# Patient Record
Sex: MALE | Race: WHITE | Employment: OTHER | URBAN - METROPOLITAN AREA
[De-identification: names, ages, dates, MRNs, and addresses within clinical notes are randomized per-mention and may not be internally consistent; named-entity substitution may affect disease eponyms.]

---

## 2021-09-15 ENCOUNTER — OFFICE VISIT (OUTPATIENT)
Dept: ENT CLINIC | Age: 66
End: 2021-09-15
Payer: MEDICARE

## 2021-09-15 VITALS
HEIGHT: 70 IN | DIASTOLIC BLOOD PRESSURE: 85 MMHG | SYSTOLIC BLOOD PRESSURE: 142 MMHG | BODY MASS INDEX: 30.06 KG/M2 | HEART RATE: 72 BPM | TEMPERATURE: 97.2 F | WEIGHT: 210 LBS

## 2021-09-15 DIAGNOSIS — J34.3 HYPERTROPHY OF INFERIOR NASAL TURBINATE: ICD-10-CM

## 2021-09-15 DIAGNOSIS — R09.81 NASAL CONGESTION: ICD-10-CM

## 2021-09-15 DIAGNOSIS — J34.2 DEVIATED SEPTUM: ICD-10-CM

## 2021-09-15 DIAGNOSIS — T16.2XXA FOREIGN BODY OF LEFT EAR, INITIAL ENCOUNTER: Primary | ICD-10-CM

## 2021-09-15 DIAGNOSIS — J32.9 CHRONIC SINUSITIS, UNSPECIFIED LOCATION: ICD-10-CM

## 2021-09-15 PROCEDURE — 1123F ACP DISCUSS/DSCN MKR DOCD: CPT | Performed by: OTOLARYNGOLOGY

## 2021-09-15 PROCEDURE — 99204 OFFICE O/P NEW MOD 45 MIN: CPT | Performed by: OTOLARYNGOLOGY

## 2021-09-15 PROCEDURE — G8427 DOCREV CUR MEDS BY ELIG CLIN: HCPCS | Performed by: OTOLARYNGOLOGY

## 2021-09-15 PROCEDURE — G8419 CALC BMI OUT NRM PARAM NOF/U: HCPCS | Performed by: OTOLARYNGOLOGY

## 2021-09-15 PROCEDURE — 69200 CLEAR OUTER EAR CANAL: CPT | Performed by: OTOLARYNGOLOGY

## 2021-09-15 PROCEDURE — 4040F PNEUMOC VAC/ADMIN/RCVD: CPT | Performed by: OTOLARYNGOLOGY

## 2021-09-15 PROCEDURE — 3017F COLORECTAL CA SCREEN DOC REV: CPT | Performed by: OTOLARYNGOLOGY

## 2021-09-15 PROCEDURE — 1036F TOBACCO NON-USER: CPT | Performed by: OTOLARYNGOLOGY

## 2021-09-15 RX ORDER — FLUTICASONE PROPIONATE 50 MCG
1 SPRAY, SUSPENSION (ML) NASAL DAILY
Qty: 32 G | Refills: 1 | Status: SHIPPED | OUTPATIENT
Start: 2021-09-15 | End: 2022-04-01 | Stop reason: SDUPTHER

## 2021-09-15 RX ORDER — MULTIVIT-MIN/IRON/FOLIC ACID/K 18-600-40
CAPSULE ORAL
COMMUNITY

## 2021-09-15 NOTE — PROGRESS NOTES
[facility]      Patient Name: Araceli Espinoza Record Number:  <[id number]>  Primary Care Physician:  Fernanda Bundy MD  Date of Consultation: 9/15/2021    Chief Complaint: Foreign body left ear        HISTORY OF PRESENT ILLNESS  Humberto Abel is a(n) 77 y.o. male who presents for evaluation of a bug in the left ear. Reportedly the patient had a bug fly in the left ear yesterday. He said that he copiously flushed and feels as though they probably drown it. He said he can feel moving around for a while. He still feels as though the ear is plugged and thinks it still inside the ear. He has never had any issue like this before. No other ear history. Patient also says that he has a lot of nasal congestion. He says that he has had nasal surgery a couple of times. This was in the 1980s. He thinks that he had a septum repaired. Not sure what else they have done. Sense of smell is good. He has tried nasal steroids. REVIEW OF SYSTEMS  As above    PHYSICAL EXAM  GENERAL: No Acute Distress, Alert and Oriented, no Hoarseness, strong voice  EYES: EOMI, Anti-icteric  HENT:   Head: Normocephalic and atraumatic. Face:  Symmetric, facial nerve intact, no sinus tenderness   ears: See below  Mouth/Oral Cavity:  normal lips, Uvula is midline, no mucosal lesions  Oropharynx/Larynx:  normal oropharynx  Nose: Patient has some rhinophyma . Anterior rhinoscopy shows a a rightward deviated septum. He has fairly diffuse mucosal edema involving the septum as well as the turbinate. The turbinates are very large.   NECK: Normal range of motion, no thyromegaly, trachea is midline, no lymphadenopathy, no neck masses, no crepitus  CHEST: Normal respiratory effort, no retractions, breathing comfortably  SKIN: No rashes, normal appearing skin, no evidence of skin lesions/tumors  Neuro:  cranial nerve II-XII intact; normal gait  Cardio:  no edema    PROCEDURE  Bilateral ear exam with removal of foreign body  Right ear is visualized microscope. Tympanic membrane intact and aerated middle ear    On the left side the patient had what appeared to be an insect occluding the ear canal.  I removed this with an alligator forcep. There was some irritation of the ear canal tympanic membrane, but it appeared to be intact. ASSESSMENT/PLAN  1. Foreign body of left ear, initial encounter  This was consistent with a an insect. It was fairly irritated so I am not to give him a short course of eardrops to prevent an infection. 2. Nasal congestion  Patient appears to have diffuse nasal mucosal edema. I want to start him on Flonase. I also started him on nasal saline irrigation. He has had multiple nasal surgeries and I would like to see exactly what was done and also rule out chronic sinusitis. He is going to follow-up with a CT scan in a few weeks. 3. Deviated septum  May consider revision septoplasty  - CT SINUS FOR IMAGE GUIDANCE; Future    4. Hypertrophy of inferior nasal turbinate  Consider inferior turbinate reduction, will discuss on follow-up    5. Chronic sinusitis, unspecified location  I like to rule out chronic sinusitis given the severe mucosal edema and the previous surgeries that he has had. I have performed a head and neck physical exam personally or was physically present during the key or critical portions of the service. This note was generated completely or in part utilizing Dragon dictation speech recognition software. Occasionally, words are mistranscribed and despite editing, the text may contain inaccuracies due to incorrect word recognition. If further clarification is needed please contact the office at [phone].

## 2022-01-12 ENCOUNTER — OFFICE VISIT (OUTPATIENT)
Dept: ENT CLINIC | Age: 67
End: 2022-01-12
Payer: MEDICARE

## 2022-01-12 ENCOUNTER — HOSPITAL ENCOUNTER (OUTPATIENT)
Dept: CT IMAGING | Age: 67
Discharge: HOME | End: 2022-01-12
Payer: MEDICARE

## 2022-01-12 VITALS
DIASTOLIC BLOOD PRESSURE: 86 MMHG | WEIGHT: 222 LBS | BODY MASS INDEX: 31.85 KG/M2 | HEART RATE: 86 BPM | SYSTOLIC BLOOD PRESSURE: 149 MMHG

## 2022-01-12 DIAGNOSIS — J34.3 HYPERTROPHY OF INFERIOR NASAL TURBINATE: ICD-10-CM

## 2022-01-12 DIAGNOSIS — J34.2 DEVIATED SEPTUM: ICD-10-CM

## 2022-01-12 DIAGNOSIS — R09.81 NASAL CONGESTION: ICD-10-CM

## 2022-01-12 DIAGNOSIS — J32.9 CHRONIC SINUSITIS, UNSPECIFIED LOCATION: Primary | ICD-10-CM

## 2022-01-12 PROCEDURE — 3017F COLORECTAL CA SCREEN DOC REV: CPT | Performed by: OTOLARYNGOLOGY

## 2022-01-12 PROCEDURE — 4040F PNEUMOC VAC/ADMIN/RCVD: CPT | Performed by: OTOLARYNGOLOGY

## 2022-01-12 PROCEDURE — G8427 DOCREV CUR MEDS BY ELIG CLIN: HCPCS | Performed by: OTOLARYNGOLOGY

## 2022-01-12 PROCEDURE — 99214 OFFICE O/P EST MOD 30 MIN: CPT | Performed by: OTOLARYNGOLOGY

## 2022-01-12 PROCEDURE — 70486 CT MAXILLOFACIAL W/O DYE: CPT

## 2022-01-12 PROCEDURE — 1123F ACP DISCUSS/DSCN MKR DOCD: CPT | Performed by: OTOLARYNGOLOGY

## 2022-01-12 PROCEDURE — 1036F TOBACCO NON-USER: CPT | Performed by: OTOLARYNGOLOGY

## 2022-01-12 PROCEDURE — G8417 CALC BMI ABV UP PARAM F/U: HCPCS | Performed by: OTOLARYNGOLOGY

## 2022-01-12 PROCEDURE — G8484 FLU IMMUNIZE NO ADMIN: HCPCS | Performed by: OTOLARYNGOLOGY

## 2022-01-12 RX ORDER — PREDNISONE 10 MG/1
TABLET ORAL
Qty: 30 TABLET | Refills: 0 | Status: SHIPPED | OUTPATIENT
Start: 2022-01-12 | End: 2022-03-04 | Stop reason: ALTCHOICE

## 2022-01-12 RX ORDER — DOXYCYCLINE HYCLATE 100 MG
100 TABLET ORAL 2 TIMES DAILY
Qty: 42 TABLET | Refills: 0 | Status: SHIPPED | OUTPATIENT
Start: 2022-01-12 | End: 2022-02-02

## 2022-01-12 NOTE — PROGRESS NOTES
[facility] [facility]  Follow up      Patient Name: Araceli Espinoza Record Number:  <[id number]>  Primary Care Physician:  Alicia Garcia MD  Date of Consultation: 1/12/2022    Chief Complaint: Nasal issues        Interval History    I first met the patient September 2021 where he had an insect in his ear. We removed that, but he also had a lot of nasal symptoms. He has a history of multiple nasal surgeries. I wanted to follow-up with a CAT scan to see exactly what was going on with his nose. He says that he really does not have much airflow at all out of his nose. He does have some sense of smell. He is using Flonase daily and irrigations. No ear problems. REVIEW OF SYSTEMS  Above    PHYSICAL EXAM  GENERAL: No Acute Distress, Alert and Oriented, no Hoarseness, strong voice  EYES: EOMI, Anti-icteric  HENT:   Head: Normocephalic and atraumatic. Face:  Symmetric, facial nerve intact, no sinus tenderness  Right Ear: Normal external ear, normal external auditory canal, intact tympanic membrane with normal mobility and aerated middle ear  Left Ear: Normal external ear, normal external auditory canal, intact tympanic membrane with normal mobility and aerated middle ear  Mouth/Oral Cavity:  normal lips, Uvula is midline, no mucosal lesions  Oropharynx/Larynx:  normal oropharynx,  Nose:Normal external nasal appearance. Anterior rhinoscopy shows severe mucosal edema with a fairly prominent right upper septal deviation. He has a very large turbinate on the right side he really cannot see anything past the anterior nasal cavity. He has almost 100% obstruction of the nasal cavity on the right. On the left side there is some purulent drainage. NECK: Normal range of motion, no thyromegaly, trachea is midline, no lymphadenopathy, no neck masses, no crepitus        RADIOLOGY  Summary of findings:  I have reviewed his CT scan. He has fairly diffuse sinusitis.   He has an intersinus septal cell in the frontal sinus as well as a possible type I frontal sinus on the right that are all opacified. The appearance of the anterior ethmoid cell and extending of frontal sinus suggest perhaps a bit of an expanding mucocele. He also has disease involving the bilateral maxillary sinuses with what appears to be previous max antrostomies. He does appear to have had a bit of work in the anterior ethmoids, but there are still a lot of septations with complete opacification. Opacification of the sphenoid sinuses as well. The skull base and lamina appear to be intact        ASSESSMENT/PLAN  1. Chronic sinusitis, unspecified location  This patient has severe ongoing sinus disease. I think he would benefit from revision sinus surgery. Judging by CT scan it looks like only his maxillary sinuses were initially addressed with surgery. I think he needs all 4 sinuses opened up. In addition he really has no nasal airway secondary to a septal deviation and very large turbinates. I would consider doing a revision septoplasty debating what it looks like, but he definitely will need partial resection of the inferior turbinate at least on the right side open up his nasal airway. Patient understands the risk of sinus surgery, septoplasty and turbinate duction including epistaxis, ongoing nasal congestion, recurrence of his symptoms and need for additional surgery. He says that this has been bothering him for years and he is very eager to have something done    Meantime I am to give him a prednisone taper as well as 3 weeks of antibiotic. He may be going out of town for a couple of months which is fine. I told him that if he has recurrence of his symptoms after I give him the antibiotics and before surgery he needs to call the office so I can give him another round of prednisone and antibiotics as this will make the surgery easier. 2. Nasal congestion  As above    3.  Deviated septum  As

## 2022-02-02 ENCOUNTER — TELEPHONE (OUTPATIENT)
Dept: ENT CLINIC | Age: 67
End: 2022-02-02

## 2022-02-02 RX ORDER — DOXYCYCLINE HYCLATE 100 MG
100 TABLET ORAL 2 TIMES DAILY
Qty: 10 TABLET | Refills: 0 | Status: SHIPPED | OUTPATIENT
Start: 2022-02-02 | End: 2022-02-07 | Stop reason: SDUPTHER

## 2022-02-02 NOTE — TELEPHONE ENCOUNTER
Pt requesting refills of  predniSONE (DELTASONE) 10 MG tablet and doxycycline hyclate (VIBRA-TABS) 100 MG tablet    Would like sent to [address] [facility], he is in [place] [phone]

## 2022-02-02 NOTE — TELEPHONE ENCOUNTER
Mary Billingsley can only call him in the doxycycline 100 mg twice a day for 10 days. I would hold off on additional prednisone has not been very long. We also confirm when is considering scheduling surgery?

## 2022-02-07 RX ORDER — DOXYCYCLINE HYCLATE 100 MG
100 TABLET ORAL 2 TIMES DAILY
Qty: 20 TABLET | Refills: 0 | Status: SHIPPED | OUTPATIENT
Start: 2022-02-07 | End: 2022-02-17

## 2022-02-08 DIAGNOSIS — Z41.9 SURGERY, ELECTIVE: Primary | ICD-10-CM

## 2022-02-22 ENCOUNTER — TELEPHONE (OUTPATIENT)
Dept: ENT CLINIC | Age: 67
End: 2022-02-22

## 2022-02-22 NOTE — TELEPHONE ENCOUNTER
Pt calling, states he thought maybe he might need a refill of the prednisone, he's not sure. He said when he was using it, it helped the swelling and now that it's gone, the swelling is back and it's harder to breathe. If Dr Radha Coleman does not want to refill it, he is fine with that, he just wasn't sure. Please call to advise. Pt does have surgery scheduled in March.

## 2022-02-23 NOTE — TELEPHONE ENCOUNTER
Looks like he is scheduled for sinus surgery in March. I would hold off on giving him another round of steroids for now.

## 2022-03-04 NOTE — PROGRESS NOTES
[facility] time__0600__________        Surgery time____________    Take the following medications with a sip of water: Follow your Doctor's pre procedure instructions regarding medications    Do not eat or drink anything after 12:00 midnight prior to your surgery. This includes water chewing gum, mints and ice chips. You may brush your teeth and gargle the morning of your surgery, but do not swallow the water     Please see your family doctor/pediatrician for a history and physical and/or concerning medications. Bring any test results/reports from your physicians office. If you are under the care of a heart doctor or specialist doctor, please be aware that you may be asked to them for clearance    You may be asked to stop blood thinners such as Coumadin, Plavix, Fragmin, Lovenox, etc., or any anti-inflammatories such as:  Aspirin, Ibuprofen, Advil, Naproxen prior to your surgery. We also ask that you stop any OTC medications such as fish oil, vitamin E, glucosamine, garlic, Multivitamins, COQ 10, etc.    We ask that you do not smoke 24 hours prior to surgery  We ask that you do not  drink any alcoholic beverages 24 hours prior to surgery     You must make arrangements for a responsible adult to take you home after your surgery. For your safety you will not be allowed to leave alone or drive yourself home. Your surgery will be cancelled if you do not have a ride home. Also for your safety, it is strongly suggested that someone stay with you the first 24 hours after your surgery. A parent or legal guardian must accompany a child scheduled for surgery and plan to stay at the hospital until the child is discharged. Please do not bring other children with you. For your comfort, please wear simple loose fitting clothing to the hospital.  Please do not bring valuables.     Do not wear any make-up or nail polish on your fingers or toes      For your safety, please do not wear any jewelry or body piercing's on the day of surgery. All jewelry must be removed. If you have dentures, they will be removed before going to operating room. For your convenience, we will provide you with a container. If you wear contact lenses or glasses, they will be removed, please bring a case for them. If you have a living will and a durable power of  for healthcare, please bring in a copy. As part of our patient safety program to minimize surgical site infections, we ask you to do the following:    · Please notify your surgeon if you develop any illness between         now and the  day of your surgery. · This includes a cough, cold, fever, sore throat, nausea,         or vomiting, and diarrhea, etc.  ·  Please notify your surgeon if you experience dizziness, shortness         of breath or blurred vision between now and the time of your surgery. Do not shave your operative site 96 hours prior to surgery. For face and neck surgery, men may use an electric razor 48 hours   prior to surgery. You may shower the night before surgery or the morning of   your surgery with an antibacterial soap. You will need to bring a photo ID and insurance card    [facility] has an onsite pharmacy, would you like to utilize our pharmacy     If you will be staying overnight and use a C-pap machine, please bring   your C-pap to hospital     Our goal is to provide you with excellent care, therefore, visitors will be limited to two(2) in the room at a time so that we may focus on providing this care for you. Please contact pre-admission testing if you have any further questions. [facility] phone number:  [phone] PAT fax number:  [phone]  Please note these are generalized instructions for all surgical cases, you may be provided with more specific instructions according to your surgery. SAFETY FIRST. .call before you [facility]    C-Difficile admission screening and protocol:  * Admitted with diarrhea? no  *Prior history of C-Diff. In last 3 months? no  *Antibiotic use in the past 6-8 weeks? .yes  *Prior hospitalization or nursing home in the last month? no

## 2022-03-18 ENCOUNTER — ANESTHESIA EVENT (OUTPATIENT)
Dept: OPERATING ROOM | Age: 67
End: 2022-03-18
Payer: MEDICARE

## 2022-03-21 DIAGNOSIS — Z41.9 SURGERY, ELECTIVE: ICD-10-CM

## 2022-03-22 ENCOUNTER — HOSPITAL ENCOUNTER (OUTPATIENT)
Age: 67
Setting detail: OUTPATIENT SURGERY
Discharge: HOME | End: 2022-03-22
Attending: OTOLARYNGOLOGY | Admitting: OTOLARYNGOLOGY
Payer: MEDICARE

## 2022-03-22 ENCOUNTER — ANESTHESIA (OUTPATIENT)
Dept: OPERATING ROOM | Age: 67
End: 2022-03-22
Payer: MEDICARE

## 2022-03-22 VITALS
DIASTOLIC BLOOD PRESSURE: 78 MMHG | HEIGHT: 70 IN | RESPIRATION RATE: 18 BRPM | BODY MASS INDEX: 29.92 KG/M2 | SYSTOLIC BLOOD PRESSURE: 124 MMHG | OXYGEN SATURATION: 91 % | WEIGHT: 209 LBS | TEMPERATURE: 97.6 F | HEART RATE: 75 BPM

## 2022-03-22 VITALS
RESPIRATION RATE: 7 BRPM | SYSTOLIC BLOOD PRESSURE: 100 MMHG | TEMPERATURE: 98.2 F | OXYGEN SATURATION: 98 % | DIASTOLIC BLOOD PRESSURE: 61 MMHG

## 2022-03-22 DIAGNOSIS — J32.9 CHRONIC SINUSITIS, UNSPECIFIED LOCATION: ICD-10-CM

## 2022-03-22 DIAGNOSIS — J34.3 HYPERTROPHY OF INFERIOR NASAL TURBINATE: ICD-10-CM

## 2022-03-22 DIAGNOSIS — J34.2 DEVIATED NASAL SEPTUM: ICD-10-CM

## 2022-03-22 DIAGNOSIS — R09.81 NASAL CONGESTION: ICD-10-CM

## 2022-03-22 LAB — SARS-COV-2: NOT DETECTED

## 2022-03-22 PROCEDURE — 2580000003 HC RX 258: Performed by: ANESTHESIOLOGY

## 2022-03-22 PROCEDURE — 6370000000 HC RX 637 (ALT 250 FOR IP): Performed by: ANESTHESIOLOGY

## 2022-03-22 PROCEDURE — 7100000010 HC PHASE II RECOVERY - FIRST 15 MIN: Performed by: OTOLARYNGOLOGY

## 2022-03-22 PROCEDURE — 7100000001 HC PACU RECOVERY - ADDTL 15 MIN: Performed by: OTOLARYNGOLOGY

## 2022-03-22 PROCEDURE — 88305 TISSUE EXAM BY PATHOLOGIST: CPT

## 2022-03-22 PROCEDURE — 7100000011 HC PHASE II RECOVERY - ADDTL 15 MIN: Performed by: OTOLARYNGOLOGY

## 2022-03-22 PROCEDURE — 2720000010 HC SURG SUPPLY STERILE: Performed by: OTOLARYNGOLOGY

## 2022-03-22 PROCEDURE — 31267 ENDOSCOPY MAXILLARY SINUS: CPT | Performed by: OTOLARYNGOLOGY

## 2022-03-22 PROCEDURE — 6370000000 HC RX 637 (ALT 250 FOR IP): Performed by: OTOLARYNGOLOGY

## 2022-03-22 PROCEDURE — C2625 STENT, NON-COR, TEM W/DEL SY: HCPCS | Performed by: OTOLARYNGOLOGY

## 2022-03-22 PROCEDURE — A4217 STERILE WATER/SALINE, 500 ML: HCPCS | Performed by: OTOLARYNGOLOGY

## 2022-03-22 PROCEDURE — 3600000005 HC SURGERY LEVEL 5 BASE: Performed by: OTOLARYNGOLOGY

## 2022-03-22 PROCEDURE — 3700000000 HC ANESTHESIA ATTENDED CARE: Performed by: OTOLARYNGOLOGY

## 2022-03-22 PROCEDURE — C1776 JOINT DEVICE (IMPLANTABLE): HCPCS | Performed by: OTOLARYNGOLOGY

## 2022-03-22 PROCEDURE — 30130 EXCISE INFERIOR TURBINATE: CPT | Performed by: OTOLARYNGOLOGY

## 2022-03-22 PROCEDURE — 2709999900 HC NON-CHARGEABLE SUPPLY: Performed by: OTOLARYNGOLOGY

## 2022-03-22 PROCEDURE — 2500000003 HC RX 250 WO HCPCS: Performed by: OTOLARYNGOLOGY

## 2022-03-22 PROCEDURE — 3600000015 HC SURGERY LEVEL 5 ADDTL 15MIN: Performed by: OTOLARYNGOLOGY

## 2022-03-22 PROCEDURE — 61782 SCAN PROC CRANIAL EXTRA: CPT | Performed by: OTOLARYNGOLOGY

## 2022-03-22 PROCEDURE — 2500000003 HC RX 250 WO HCPCS

## 2022-03-22 PROCEDURE — 31287 NASAL/SINUS ENDOSCOPY SURG: CPT | Performed by: OTOLARYNGOLOGY

## 2022-03-22 PROCEDURE — 2580000003 HC RX 258: Performed by: OTOLARYNGOLOGY

## 2022-03-22 PROCEDURE — 6360000002 HC RX W HCPCS

## 2022-03-22 PROCEDURE — 31253 NSL/SINS NDSC TOTAL: CPT | Performed by: OTOLARYNGOLOGY

## 2022-03-22 PROCEDURE — 7100000000 HC PACU RECOVERY - FIRST 15 MIN: Performed by: OTOLARYNGOLOGY

## 2022-03-22 PROCEDURE — 3700000001 HC ADD 15 MINUTES (ANESTHESIA): Performed by: OTOLARYNGOLOGY

## 2022-03-22 RX ORDER — LIDOCAINE HYDROCHLORIDE AND EPINEPHRINE BITARTRATE 20; .01 MG/ML; MG/ML
INJECTION, SOLUTION SUBCUTANEOUS
Status: COMPLETED | OUTPATIENT
Start: 2022-03-22 | End: 2022-03-22

## 2022-03-22 RX ORDER — MIDAZOLAM HYDROCHLORIDE 1 MG/ML
INJECTION INTRAMUSCULAR; INTRAVENOUS PRN
Status: DISCONTINUED | OUTPATIENT
Start: 2022-03-22 | End: 2022-03-22 | Stop reason: SDUPTHER

## 2022-03-22 RX ORDER — FENTANYL CITRATE 50 UG/ML
INJECTION, SOLUTION INTRAMUSCULAR; INTRAVENOUS PRN
Status: DISCONTINUED | OUTPATIENT
Start: 2022-03-22 | End: 2022-03-22 | Stop reason: SDUPTHER

## 2022-03-22 RX ORDER — OXYCODONE HYDROCHLORIDE 10 MG/1
10 TABLET ORAL PRN
Status: COMPLETED | OUTPATIENT
Start: 2022-03-22 | End: 2022-03-22

## 2022-03-22 RX ORDER — ROCURONIUM BROMIDE 10 MG/ML
INJECTION, SOLUTION INTRAVENOUS PRN
Status: DISCONTINUED | OUTPATIENT
Start: 2022-03-22 | End: 2022-03-22 | Stop reason: SDUPTHER

## 2022-03-22 RX ORDER — SODIUM CHLORIDE 0.9 % (FLUSH) 0.9 %
5-40 SYRINGE (ML) INJECTION EVERY 12 HOURS SCHEDULED
Status: DISCONTINUED | OUTPATIENT
Start: 2022-03-22 | End: 2022-03-23 | Stop reason: HOSPADM

## 2022-03-22 RX ORDER — SODIUM CHLORIDE 0.9 % (FLUSH) 0.9 %
5-40 SYRINGE (ML) INJECTION PRN
Status: DISCONTINUED | OUTPATIENT
Start: 2022-03-22 | End: 2022-03-23 | Stop reason: HOSPADM

## 2022-03-22 RX ORDER — SODIUM CHLORIDE 0.9 % (FLUSH) 0.9 %
10 SYRINGE (ML) INJECTION EVERY 12 HOURS SCHEDULED
Status: DISCONTINUED | OUTPATIENT
Start: 2022-03-22 | End: 2022-03-23 | Stop reason: HOSPADM

## 2022-03-22 RX ORDER — FENTANYL CITRATE 50 UG/ML
25 INJECTION, SOLUTION INTRAMUSCULAR; INTRAVENOUS EVERY 5 MIN PRN
Status: DISCONTINUED | OUTPATIENT
Start: 2022-03-22 | End: 2022-03-23 | Stop reason: HOSPADM

## 2022-03-22 RX ORDER — FENTANYL CITRATE 50 UG/ML
50 INJECTION, SOLUTION INTRAMUSCULAR; INTRAVENOUS EVERY 5 MIN PRN
Status: DISCONTINUED | OUTPATIENT
Start: 2022-03-22 | End: 2022-03-23 | Stop reason: HOSPADM

## 2022-03-22 RX ORDER — MAGNESIUM HYDROXIDE 1200 MG/15ML
LIQUID ORAL CONTINUOUS PRN
Status: COMPLETED | OUTPATIENT
Start: 2022-03-22 | End: 2022-03-22

## 2022-03-22 RX ORDER — SODIUM CHLORIDE 9 MG/ML
25 INJECTION, SOLUTION INTRAVENOUS PRN
Status: DISCONTINUED | OUTPATIENT
Start: 2022-03-22 | End: 2022-03-23 | Stop reason: HOSPADM

## 2022-03-22 RX ORDER — OXYCODONE HYDROCHLORIDE 5 MG/1
5 TABLET ORAL PRN
Status: COMPLETED | OUTPATIENT
Start: 2022-03-22 | End: 2022-03-22

## 2022-03-22 RX ORDER — OXYMETAZOLINE HYDROCHLORIDE 0.05 G/100ML
SPRAY NASAL
Status: COMPLETED | OUTPATIENT
Start: 2022-03-22 | End: 2022-03-22

## 2022-03-22 RX ORDER — DEXAMETHASONE SODIUM PHOSPHATE 4 MG/ML
INJECTION, SOLUTION INTRA-ARTICULAR; INTRALESIONAL; INTRAMUSCULAR; INTRAVENOUS; SOFT TISSUE PRN
Status: DISCONTINUED | OUTPATIENT
Start: 2022-03-22 | End: 2022-03-22 | Stop reason: SDUPTHER

## 2022-03-22 RX ORDER — SUCCINYLCHOLINE/SOD CL,ISO/PF 200MG/10ML
SYRINGE (ML) INTRAVENOUS PRN
Status: DISCONTINUED | OUTPATIENT
Start: 2022-03-22 | End: 2022-03-22 | Stop reason: SDUPTHER

## 2022-03-22 RX ORDER — HYDROCODONE BITARTRATE AND ACETAMINOPHEN 5; 325 MG/1; MG/1
1 TABLET ORAL EVERY 6 HOURS PRN
Qty: 20 TABLET | Refills: 0 | Status: SHIPPED | OUTPATIENT
Start: 2022-03-22 | End: 2022-03-27

## 2022-03-22 RX ORDER — PHENYLEPHRINE HCL IN 0.9% NACL 1 MG/10 ML
SYRINGE (ML) INTRAVENOUS PRN
Status: DISCONTINUED | OUTPATIENT
Start: 2022-03-22 | End: 2022-03-22 | Stop reason: SDUPTHER

## 2022-03-22 RX ORDER — DOXYCYCLINE HYCLATE 100 MG
100 TABLET ORAL 2 TIMES DAILY
Qty: 14 TABLET | Refills: 0 | Status: SHIPPED | OUTPATIENT
Start: 2022-03-22 | End: 2022-03-29

## 2022-03-22 RX ORDER — ONDANSETRON 2 MG/ML
4 INJECTION INTRAMUSCULAR; INTRAVENOUS
Status: DISCONTINUED | OUTPATIENT
Start: 2022-03-22 | End: 2022-03-22 | Stop reason: HOSPADM

## 2022-03-22 RX ORDER — DEXMEDETOMIDINE HYDROCHLORIDE 100 UG/ML
INJECTION, SOLUTION INTRAVENOUS PRN
Status: DISCONTINUED | OUTPATIENT
Start: 2022-03-22 | End: 2022-03-22 | Stop reason: SDUPTHER

## 2022-03-22 RX ORDER — PROPOFOL 10 MG/ML
INJECTION, EMULSION INTRAVENOUS PRN
Status: DISCONTINUED | OUTPATIENT
Start: 2022-03-22 | End: 2022-03-22 | Stop reason: SDUPTHER

## 2022-03-22 RX ORDER — LIDOCAINE HYDROCHLORIDE 20 MG/ML
INJECTION, SOLUTION EPIDURAL; INFILTRATION; INTRACAUDAL; PERINEURAL PRN
Status: DISCONTINUED | OUTPATIENT
Start: 2022-03-22 | End: 2022-03-22 | Stop reason: SDUPTHER

## 2022-03-22 RX ORDER — PREDNISONE 10 MG/1
TABLET ORAL
Qty: 30 TABLET | Refills: 0 | Status: SHIPPED | OUTPATIENT
Start: 2022-03-22

## 2022-03-22 RX ORDER — MEPERIDINE HYDROCHLORIDE 25 MG/ML
12.5 INJECTION INTRAMUSCULAR; INTRAVENOUS; SUBCUTANEOUS EVERY 5 MIN PRN
Status: DISCONTINUED | OUTPATIENT
Start: 2022-03-22 | End: 2022-03-23 | Stop reason: HOSPADM

## 2022-03-22 RX ORDER — SODIUM CHLORIDE 9 MG/ML
INJECTION, SOLUTION INTRAVENOUS CONTINUOUS
Status: DISCONTINUED | OUTPATIENT
Start: 2022-03-22 | End: 2022-03-23 | Stop reason: HOSPADM

## 2022-03-22 RX ORDER — SODIUM CHLORIDE 0.9 % (FLUSH) 0.9 %
10 SYRINGE (ML) INJECTION PRN
Status: DISCONTINUED | OUTPATIENT
Start: 2022-03-22 | End: 2022-03-23 | Stop reason: HOSPADM

## 2022-03-22 RX ADMIN — DEXMEDETOMIDINE 4 MCG: 100 INJECTION, SOLUTION, CONCENTRATE INTRAVENOUS at 08:17

## 2022-03-22 RX ADMIN — PROPOFOL 50 MG: 10 INJECTION, EMULSION INTRAVENOUS at 08:16

## 2022-03-22 RX ADMIN — Medication 200 MCG: at 08:03

## 2022-03-22 RX ADMIN — DEXMEDETOMIDINE 4 MCG: 100 INJECTION, SOLUTION, CONCENTRATE INTRAVENOUS at 08:54

## 2022-03-22 RX ADMIN — SODIUM CHLORIDE: 9 INJECTION, SOLUTION INTRAVENOUS at 09:01

## 2022-03-22 RX ADMIN — DEXAMETHASONE SODIUM PHOSPHATE 10 MG: 4 INJECTION, SOLUTION INTRAMUSCULAR; INTRAVENOUS at 07:43

## 2022-03-22 RX ADMIN — ROCURONIUM BROMIDE 10 MG: 10 SOLUTION INTRAVENOUS at 09:34

## 2022-03-22 RX ADMIN — FENTANYL CITRATE 25 MCG: 50 INJECTION INTRAMUSCULAR; INTRAVENOUS at 09:51

## 2022-03-22 RX ADMIN — Medication 100 MCG: at 10:22

## 2022-03-22 RX ADMIN — PROPOFOL 200 MG: 10 INJECTION, EMULSION INTRAVENOUS at 07:38

## 2022-03-22 RX ADMIN — Medication 100 MCG: at 09:15

## 2022-03-22 RX ADMIN — DEXMEDETOMIDINE 4 MCG: 100 INJECTION, SOLUTION, CONCENTRATE INTRAVENOUS at 07:52

## 2022-03-22 RX ADMIN — DEXMEDETOMIDINE 8 MCG: 100 INJECTION, SOLUTION, CONCENTRATE INTRAVENOUS at 08:16

## 2022-03-22 RX ADMIN — MIDAZOLAM 2 MG: 1 INJECTION INTRAMUSCULAR; INTRAVENOUS at 07:30

## 2022-03-22 RX ADMIN — SUGAMMADEX 200 MG: 100 INJECTION, SOLUTION INTRAVENOUS at 10:30

## 2022-03-22 RX ADMIN — FENTANYL CITRATE 25 MCG: 50 INJECTION INTRAMUSCULAR; INTRAVENOUS at 08:24

## 2022-03-22 RX ADMIN — DEXMEDETOMIDINE 2 MCG: 100 INJECTION, SOLUTION, CONCENTRATE INTRAVENOUS at 09:53

## 2022-03-22 RX ADMIN — FENTANYL CITRATE 50 MCG: 50 INJECTION INTRAMUSCULAR; INTRAVENOUS at 07:38

## 2022-03-22 RX ADMIN — LIDOCAINE HYDROCHLORIDE 100 MG: 20 INJECTION, SOLUTION EPIDURAL; INFILTRATION; INTRACAUDAL; PERINEURAL at 07:38

## 2022-03-22 RX ADMIN — FENTANYL CITRATE 50 MCG: 50 INJECTION INTRAMUSCULAR; INTRAVENOUS at 07:46

## 2022-03-22 RX ADMIN — Medication 200 MCG: at 07:56

## 2022-03-22 RX ADMIN — FENTANYL CITRATE 25 MCG: 50 INJECTION INTRAMUSCULAR; INTRAVENOUS at 08:18

## 2022-03-22 RX ADMIN — Medication 100 MCG: at 10:19

## 2022-03-22 RX ADMIN — Medication 150 MCG: at 09:43

## 2022-03-22 RX ADMIN — ROCURONIUM BROMIDE 10 MG: 10 SOLUTION INTRAVENOUS at 10:18

## 2022-03-22 RX ADMIN — Medication 120 MG: at 07:38

## 2022-03-22 RX ADMIN — Medication 200 MCG: at 08:07

## 2022-03-22 RX ADMIN — DEXMEDETOMIDINE 4 MCG: 100 INJECTION, SOLUTION, CONCENTRATE INTRAVENOUS at 09:01

## 2022-03-22 RX ADMIN — OXYCODONE HYDROCHLORIDE 10 MG: 10 TABLET ORAL at 13:12

## 2022-03-22 RX ADMIN — SODIUM CHLORIDE: 9 INJECTION, SOLUTION INTRAVENOUS at 07:02

## 2022-03-22 RX ADMIN — DEXMEDETOMIDINE 4 MCG: 100 INJECTION, SOLUTION, CONCENTRATE INTRAVENOUS at 08:45

## 2022-03-22 RX ADMIN — FENTANYL CITRATE 25 MCG: 50 INJECTION INTRAMUSCULAR; INTRAVENOUS at 08:39

## 2022-03-22 RX ADMIN — HYDROMORPHONE HYDROCHLORIDE 0.5 MG: 1 INJECTION, SOLUTION INTRAMUSCULAR; INTRAVENOUS; SUBCUTANEOUS at 10:32

## 2022-03-22 RX ADMIN — ROCURONIUM BROMIDE 50 MG: 10 SOLUTION INTRAVENOUS at 07:43

## 2022-03-22 RX ADMIN — Medication 150 MCG: at 08:01

## 2022-03-22 RX ADMIN — Medication 100 MCG: at 10:24

## 2022-03-22 ASSESSMENT — PULMONARY FUNCTION TESTS
PIF_VALUE: 20
PIF_VALUE: 0
PIF_VALUE: 20
PIF_VALUE: 19
PIF_VALUE: 20
PIF_VALUE: 20
PIF_VALUE: 18
PIF_VALUE: 14
PIF_VALUE: 20
PIF_VALUE: 0
PIF_VALUE: 20
PIF_VALUE: 25
PIF_VALUE: 20
PIF_VALUE: 21
PIF_VALUE: 20
PIF_VALUE: 20
PIF_VALUE: 21
PIF_VALUE: 13
PIF_VALUE: 20
PIF_VALUE: 20
PIF_VALUE: 0
PIF_VALUE: 3
PIF_VALUE: 20
PIF_VALUE: 20
PIF_VALUE: 14
PIF_VALUE: 20
PIF_VALUE: 19
PIF_VALUE: 20
PIF_VALUE: 1
PIF_VALUE: 20
PIF_VALUE: 20
PIF_VALUE: 17
PIF_VALUE: 20
PIF_VALUE: 8
PIF_VALUE: 20
PIF_VALUE: 19
PIF_VALUE: 19
PIF_VALUE: 20
PIF_VALUE: 20
PIF_VALUE: 19
PIF_VALUE: 20
PIF_VALUE: 20
PIF_VALUE: 17
PIF_VALUE: 14
PIF_VALUE: 20
PIF_VALUE: 19
PIF_VALUE: 20
PIF_VALUE: 7
PIF_VALUE: 19
PIF_VALUE: 20
PIF_VALUE: 20
PIF_VALUE: 19
PIF_VALUE: 40
PIF_VALUE: 20
PIF_VALUE: 20
PIF_VALUE: 15
PIF_VALUE: 20
PIF_VALUE: 17
PIF_VALUE: 20
PIF_VALUE: 19
PIF_VALUE: 20
PIF_VALUE: 18
PIF_VALUE: 20
PIF_VALUE: 0
PIF_VALUE: 20
PIF_VALUE: 19
PIF_VALUE: 20
PIF_VALUE: 19
PIF_VALUE: 21
PIF_VALUE: 20
PIF_VALUE: 2
PIF_VALUE: 20
PIF_VALUE: 1
PIF_VALUE: 19
PIF_VALUE: 20
PIF_VALUE: 19
PIF_VALUE: 19
PIF_VALUE: 20
PIF_VALUE: 21
PIF_VALUE: 20
PIF_VALUE: 0
PIF_VALUE: 20
PIF_VALUE: 13
PIF_VALUE: 20
PIF_VALUE: 0
PIF_VALUE: 20
PIF_VALUE: 20
PIF_VALUE: 21
PIF_VALUE: 20
PIF_VALUE: 19
PIF_VALUE: 17
PIF_VALUE: 20
PIF_VALUE: 13
PIF_VALUE: 21
PIF_VALUE: 20
PIF_VALUE: 14
PIF_VALUE: 20
PIF_VALUE: 19

## 2022-03-22 ASSESSMENT — PAIN SCALES - GENERAL
PAINLEVEL_OUTOF10: 0
PAINLEVEL_OUTOF10: 7
PAINLEVEL_OUTOF10: 0

## 2022-03-22 ASSESSMENT — PAIN - FUNCTIONAL ASSESSMENT
PAIN_FUNCTIONAL_ASSESSMENT: PREVENTS OR INTERFERES SOME ACTIVE ACTIVITIES AND ADLS
PAIN_FUNCTIONAL_ASSESSMENT: 0-10

## 2022-03-22 ASSESSMENT — PAIN DESCRIPTION - FREQUENCY: FREQUENCY: CONTINUOUS

## 2022-03-22 ASSESSMENT — PAIN DESCRIPTION - PROGRESSION: CLINICAL_PROGRESSION: NOT CHANGED

## 2022-03-22 ASSESSMENT — PAIN DESCRIPTION - DESCRIPTORS: DESCRIPTORS: ACHING;HEADACHE

## 2022-03-22 ASSESSMENT — PAIN DESCRIPTION - LOCATION: LOCATION: HEAD

## 2022-03-22 ASSESSMENT — LIFESTYLE VARIABLES: SMOKING_STATUS: 0

## 2022-03-22 ASSESSMENT — PAIN DESCRIPTION - ONSET: ONSET: ON-GOING

## 2022-03-22 ASSESSMENT — ENCOUNTER SYMPTOMS: SHORTNESS OF BREATH: 0

## 2022-03-22 NOTE — ANESTHESIA PRE PROCEDURE
Department of Anesthesiology  Preprocedure Note       Name:  Yazmin William   Age:  77 y.o.  :  1955                                          MRN:  [id number]         Date:  3/22/2022      Surgeon: Laura Garvey):  Nohemi Hadley MD    Procedure: Procedure(s):  ENDOSCOPIC SINUS SURGERY WITH IMAGE GUIDANCE  SEPTOPLASTY, INFERIOR TURINATE REDUCTION    Medications prior to admission:   Prior to Admission medications    Medication Sig Start Date End Date Taking? Authorizing Provider   Cholecalciferol (VITAMIN D) 50 MCG (2000) CAPS capsule Take by mouth    Historical Provider, MD   fluticasone (FLONASE) 50 MCG/ACT nasal spray 1 spray by Each Nostril route daily 9/15/21   Nohemi Hadley MD       Current medications:    Current Facility-Administered Medications   Medication Dose Route Frequency Provider Last Rate Last Admin    0.9 % sodium chloride infusion   IntraVENous Continuous Dory Thao MD        sodium chloride flush 0.9 % injection 10 mL  10 mL IntraVENous 2 times per day Dory Thao MD        sodium chloride flush 0.9 % injection 10 mL  10 mL IntraVENous PRN Dory Thao MD        0.9 % sodium chloride infusion  25 mL IntraVENous PRN Dory Thao MD           Allergies:  No Known Allergies    Problem List:  There is no problem list on file for this patient.       Past Medical History:        Diagnosis Date    Sinus congestion        Past Surgical History:        Procedure Laterality Date    BACK SURGERY      cervical    BRAIN SURGERY      for fall injury    FEMUR SURGERY Bilateral     ORIF    JOINT REPLACEMENT Left     knee       Social History:    Social History     Tobacco Use    Smoking status: Never Smoker    Smokeless tobacco: Never Used   Substance Use Topics    Alcohol use: Yes     Comment: socially                                Counseling given: Not Answered      Vital Signs (Current):   Vitals:    22 1146 22 0635 22 0654   BP:   (!) 152/90   Pulse:   75 Resp:   18   Temp:   97 °F (36.1 °C)   TempSrc:   Temporal   SpO2:   96%   Weight: 209 lb (94.8 kg) 209 lb (94.8 kg)    Height: 5' 10\" (1.778 m)                                                BP Readings from Last 3 Encounters:   03/22/22 (!) 152/90   01/12/22 (!) 149/86   09/15/21 (!) 142/85       NPO Status: Time of last liquid consumption: 2100                        Time of last solid consumption: 2100                        Date of last liquid consumption: 03/21/22                        Date of last solid food consumption: 03/21/22    BMI:   Wt Readings from Last 3 Encounters:   03/22/22 209 lb (94.8 kg)   01/12/22 222 lb (100.7 kg)   09/15/21 210 lb (95.3 kg)     Body mass index is 29.99 kg/m². CBC: No results found for: WBC, RBC, HGB, HCT, MCV, RDW, PLT    CMP: No results found for: NA, K, CL, CO2, BUN, CREATININE, GFRAA, AGRATIO, LABGLOM, GLUCOSE, GLU, PROT, CALCIUM, BILITOT, ALKPHOS, AST, ALT    POC Tests: No results for input(s): POCGLU, POCNA, POCK, POCCL, POCBUN, POCHEMO, POCHCT in the last 72 hours.     Coags: No results found for: PROTIME, INR, APTT    HCG (If Applicable): No results found for: PREGTESTUR, PREGSERUM, HCG, HCGQUANT     ABGs: No results found for: PHART, PO2ART, TQO0MIJ, SSN8HUH, BEART, U0HEVKWG     Type & Screen (If Applicable):  No results found for: LABABO, LABRH    Drug/Infectious Status (If Applicable):  No results found for: HIV, HEPCAB    COVID-19 Screening (If Applicable):   Lab Results   Component Value Date    COVID19 Not Detected 03/21/2022           Anesthesia Evaluation  Patient summary reviewed and Nursing notes reviewed no history of anesthetic complications:   Airway: Mallampati: II  TM distance: >3 FB   Neck ROM: full  Mouth opening: > = 3 FB Dental: normal exam         Pulmonary: breath sounds clear to auscultation      (-) pneumonia, COPD, asthma, shortness of breath, recent URI, sleep apnea and not a current smoker Cardiovascular:  Exercise tolerance: good (>4 METS),   (+) hypertension:,     (-) pacemaker, valvular problems/murmurs, past MI, CAD, CABG/stent, dysrhythmias,  angina,  CHF, orthopnea, PND,  LOTT, no pulmonary hypertension and no hyperlipidemia      Rhythm: regular                      Neuro/Psych:      (-) seizures, neuromuscular disease, TIA, CVA, headaches, psychiatric history and depression/anxiety             ROS comment: C7-T1 foraminal stenosis, had surgery with neurosurg at [facility] 2021 2015 Subdural hematoma s/p craniectomy 2015 GI/Hepatic/Renal: Neg GI/Hepatic/Renal ROS            Endo/Other:    (+) : arthritis: OA., no malignancy/cancer. (-) diabetes mellitus, hypothyroidism, hyperthyroidism, blood dyscrasia, no electrolyte abnormalities, no malignancy/cancer               Abdominal:             Vascular: negative vascular ROS. Other Findings:           Anesthesia Plan      general     ASA 3       Induction: intravenous. MIPS: Prophylactic antiemetics administered. Anesthetic plan and risks discussed with patient and spouse. Plan discussed with CRNA. Christian Cuba MD   3/22/2022      This pre-anesthesia assessment may be used as a history and physical.    DOS STAFF ADDENDUM:    Pt seen and examined, chart reviewed (including anesthesia, drug and allergy history). No interval changes to history and physical examination. Anesthetic plan, risks, benefits, alternatives, and personnel involved discussed with patient. Patient verbalized an understanding and agrees to proceed.       Christian Cuba MD  March 22, 2022  6:57 AM

## 2022-03-22 NOTE — PROGRESS NOTES
Pt arrived to phase 2. Denies pain. VSS.          Electronically signed by Jerson Garcia RN on 3/22/2022 at 12:32 PM

## 2022-03-22 NOTE — PROGRESS NOTES
Dr. Anne Menjivar at bedside for concern of dressing changed 4x with moderate drainage. Per Dr. Anne Menjivar, okay to discharge.      Electronically signed by Leroy Estevez RN on 3/22/2022 at 2:35 PM

## 2022-03-22 NOTE — H&P
I have reviewed this patient's history and physical.  There have been no significant changes. The patient was counseled at length about the risks of joby Covid-19 during their perioperative period and any recovery window from their procedure. The patient was made aware that joby Covid-19  may worsen their prognosis for recovering from their procedure  and lend to a higher morbidity and/or mortality risk. All material risks, benefits, and reasonable alternatives including postponing the procedure were discussed. The patient does wish to proceed with the procedure at this time. The patient understands the risks of endoscopic sinus surgery, turbinate resection and revision septoplasty including ongoing nasal congestion, CSF leak, damage to the eye and nosebleed. He agrees to proceed.

## 2022-03-22 NOTE — PROGRESS NOTES
Pt discharged to home. Accompanied by spouse. Transported in personal vehicle. Discharge instructions, Rx, and personal belongings given to pt. Explanation of discharge medications and instructions understood by verbal statement. No questions, comments or concerns at this time.        Electronically signed by Kierra Guzman RN on 3/22/2022 at 2:37 PM

## 2022-03-22 NOTE — PROGRESS NOTES
Patient to pacu from OR 4, vss on 10lpm non-rebreather, patient's oral airway in place, will continue to monitor. 1059: Patient's oral airway removed without issue, patient denies any pain, will continue to monitor.

## 2022-03-22 NOTE — PROGRESS NOTES
Patient resting comfortably with ice mask on eyes and a gauze mustache to get drainage from nose, will continue to monitor.

## 2022-03-22 NOTE — OP NOTE
[address] [facility] SURGERY  OPERATIVE REPORT    Patient Name: Chester Fallon  YOB: 1955  Medical Record Number:  [id number]  Billing Number:  [id number]  Date of Procedure: [unfilled]  Time: 0730    Pre Operative Diagnoses:   Problem List Items Addressed This Visit     None      Visit Diagnoses     Chronic sinusitis, unspecified location        Relevant Medications    HYDROcodone-acetaminophen (Ilean ) 5-325 MG per tablet    predniSONE (DELTASONE) 10 MG tablet    doxycycline hyclate (VIBRA-TABS) 100 MG tablet    Other Relevant Orders    Surgical Pathology    Surgical Pathology    Nasal congestion        Relevant Orders    Surgical Pathology    Surgical Pathology    Deviated nasal septum        Relevant Orders    Surgical Pathology    Surgical Pathology    Hypertrophy of inferior nasal turbinate        Relevant Orders    Surgical Pathology    Surgical Pathology        Post Operative Diagnoses:    Problem List Items Addressed This Visit     None      Visit Diagnoses     Chronic sinusitis, unspecified location        Relevant Medications    HYDROcodone-acetaminophen (Ilean ) 5-325 MG per tablet    predniSONE (DELTASONE) 10 MG tablet    doxycycline hyclate (VIBRA-TABS) 100 MG tablet    Other Relevant Orders    Surgical Pathology    Surgical Pathology    Nasal congestion        Relevant Orders    Surgical Pathology    Surgical Pathology    Deviated nasal septum        Relevant Orders    Surgical Pathology    Surgical Pathology    Hypertrophy of inferior nasal turbinate        Relevant Orders    Surgical Pathology    Surgical Pathology                 Procedure:  Bilateral total ethmoidectomy and frontal sinus exploration with removal of tissue (79398-95)  Bilateral maxillary antrostomy with tissue removal (77381-42)  Bilateral sphenoidotomy with tissue removal (06046-25)  STEALTH image guidance (30255)  Bilateral partial resection of inferior turbinates (09020-70)       Surgeon: Kenzie Lugo MD    OR Staff/ Assistant:  Circulator: Arnoldo Hogan RN  Relief Circulator: Angela Clarke RN  Scrub Person First: Marahalison Duong; Tala Henson  Circulator Assist: Angela Clarke RN    Anesthesia:  General anesthesia. Findings:  1)diffuse polyp disease involving all 4 sinuses bilaterally  2. Evidence of previous sinus surgery mostly involving the maxillary sinuses. 3.  Expanding mucocele in left frontal sinus. 4. Bilateral very large inferior turbinates    Indications: This is a 77 y.o. male with chronic sinusitis and nasal polyps as well as very large turbinates and a deviated septum. Despite appropriate medical therapy had ongoing severe symptoms and severe sinusitis on CT scan. He is here for revision endoscopic sinus surgery with possible septoplasty and turbinate reduction. Risks and benefits discussed with the patient including alternate treatment options, Informed consent was obtained, the patient elected to proceed with the planned procedure. DETAILS OF PROCEDURE(S):   The patient was brought to the operating room placed in supine position on the operating table. He underwent uncomplicated general anesthesia with endotracheal intubation. Exam showed excessively large turbinate touching the septum. 5 mL 1% lidocaine with epinephrine was injected into the turbinates and septum. He was then prepped and draped. The image guidance was placed in the patient's forehead and calibrated. Attention was first turned the left side. There was severe polyp disease in the middle meatus that was debrided this revealed the septum which was somewhat deviated, but appeared to have had extensive previous septoplasty's resulting fairly mobile septum. Decision was made not to perform a septoplasty as I did not feel as though there was much left I can take safely.   After removal of the polyps from the middle meatus the maxillary ostium was identified and widened further in every direction by removing tissue. Dissection was then carried out posteriorly into the ethmoid cavities and finally the sphenoid. Tissue was removed from the sphenoid itself to widen the ostium. the skull base and lamina were identified posteriorly and dissection was carried from posterior to anterior direction. Next attention was turned to the frontal sinus. Some anterior ethmoid cells that were obstructing the frontal sinus drainage pathway were removed. This revealed a large superior anterior ethmoid cell with an expanding mucocele. The inferior aspect this was first removed. The superior aspect of the expanding mucocele was occluding some of the superior frontal sinus on the left so this was also removed. This resulted in a very widely patent frontal sinus drainage pathway. Attention was then turned to the right side. Again polyp disease had to be removed in the middle meatus. The maxillary antrostomy that had been performed previously was widened every direction. Polyps were removed from the sinus itself. Dissection was then carried out posteriorly into the sphenoid. Tissue was removed from the sphenoid itself. The skull base and lamina were identified posteriorly and a posterior to anterior dissection was carried out. Next attention was turned to the frontal sinus drainage pathway. Again several small ethmoid cells were removed which opened up the pathway. Tissue was then removed from the frontal sinus itself. This resulted in a widely patent frontal sinus. Bilateral propel contour splints were then placed in the frontal sinuses. Attention was then turned to the inferior turbinates. These were noted to be excessively large. An incision was made along the inferior aspect of each inferior turbinate. The lateral mucosa was then removed. Most of the bone of the inferior turbinates was then removed sharply. The remaining bone was fractured laterally.   Suction Bovie was used to obtain hemostasis. Surgicel was placed over the inferior turbinate incision. Next Nasopore was placed in the bilateral middle meatus. The head of bed was rotated back 180 degrees. The patient was allowed to awaken, extubated and taken to recovery in stable condition peer      I attest that I was present for and did the entire procedure myself. Estimated Blood Loss: 700 mL                 Specimens:   ID Type Source Tests Collected by Time Destination   A : left nasal mass Nasal Nose SURGICAL PATHOLOGY Panda Quijano MD 3/22/2022 9349    B : right nasal mass Nasal Nose SURGICAL PATHOLOGY Panda Quijano MD 3/22/2022 5371                 Complications: There were no complications.     Panda Quijano MD

## 2022-03-22 NOTE — PROGRESS NOTES
Chet with the [facility] patient's wife requested scripts be sent there. They will call to have scripts transferred.

## 2022-03-22 NOTE — ANESTHESIA POSTPROCEDURE EVALUATION
Department of Anesthesiology  Postprocedure Note    Patient: Murphy Record  MRN: [id number]  [place]: 1955  Date of evaluation: 3/22/2022  Time:  3:43 PM     Procedure Summary     Date: 03/22/22 Room / Location: [address] [place] [address]    Anesthesia Start: 0730 Anesthesia Stop: 3486    Procedures:       ENDOSCOPIC SINUS SURGERY WITH IMAGE GUIDANCE (N/A Nose)      SEPTOPLASTY, INFERIOR TURINATE REDUCTION (N/A Nose) Diagnosis:       Chronic sinusitis, unspecified location      Nasal congestion      Deviated nasal septum      Hypertrophy of inferior nasal turbinate      (CHRONIC SINUSITIS, NASAL CONGESTION, DEVIATED SEPTUM, INFERIOR TURBINATE HYPERTROPHY)    Surgeons: Fidel Barros MD Responsible Provider: Jose Martel MD    Anesthesia Type: general ASA Status: 3          Anesthesia Type: general    Kole Phase I: Kole Score: 10    Kole Phase II: Kole Score: 10    Last vitals: Reviewed and per EMR flowsheets.        Anesthesia Post Evaluation    Patient location during evaluation: PACU  Level of consciousness: awake and alert  Airway patency: patent  Nausea & Vomiting: no nausea and no vomiting  Complications: no  Cardiovascular status: blood pressure returned to baseline  Respiratory status: acceptable  Hydration status: euvolemic  Comments: Postoperative Anesthesia Note    Name:    Murphy Record  MRN:      [id number]    Patient Vitals in the past 12 hrs:  03/22/22 1300, BP:124/78, Temp:97.6 °F (36.4 °C), Temp src:Temporal, Pulse:75, Resp:18, SpO2:91 %  03/22/22 1225, BP:120/76, Temp:97.2 °F (36.2 °C), Temp src:Temporal, Pulse:80, Resp:16, SpO2:90 %  03/22/22 1210, BP:105/65, Temp:98 °F (36.7 °C), Pulse:71, Resp:12, SpO2:90 %  03/22/22 1200, BP:115/67, Pulse:74, Resp:13, SpO2:90 %  03/22/22 1145, BP:114/71, Pulse:67, Resp:13, SpO2:98 %  03/22/22 1130, BP:107/67, Pulse:71, Resp:15, SpO2:97 %  03/22/22 1115, BP:110/69, Pulse:69, Resp:14, SpO2:97 %  03/22/22 1105, BP:118/76, Pulse:79, Resp:16, SpO2:(!) 89 %  03/22/22 1100, BP:115/75, Pulse:76, Resp:12, SpO2:93 %  03/22/22 1055, BP:128/86, Pulse:78, Resp:26, SpO2:97 %  03/22/22 1050, BP:127/75, Pulse:74, Resp:19, SpO2:98 %  03/22/22 1045, BP:132/79, Temp:97.6 °F (36.4 °C), Pulse:74, Resp:17, SpO2:97 %  03/22/22 0654, BP:(!) 152/90, Temp:97 °F (36.1 °C), Temp src:Temporal, Pulse:75, Resp:18, SpO2:96 %  03/22/22 0635, Weight:209 lb (94.8 kg)     LABS:    CBC  No results found for: WBC, HGB, HCT, PLT  RENAL  No results found for: NA, K, CL, CO2, BUN, CREATININE, GLUCOSE  COAGS  No results found for: PROTIME, INR, APTT    Intake & Output:  @89JHZG@    Nausea & Vomiting:  No    Level of Consciousness:  Awake    Pain Assessment:  Adequate analgesia    Anesthesia Complications:  No apparent anesthetic complications    SUMMARY      Vital signs stable  OK to discharge from Stage I post anesthesia care.   Care transferred from Anesthesiology department on discharge from perioperative area

## 2022-03-23 ENCOUNTER — TELEPHONE (OUTPATIENT)
Dept: ENT CLINIC | Age: 67
End: 2022-03-23

## 2022-03-24 NOTE — TELEPHONE ENCOUNTER
Spoke w Pt. He is doing the rinses as instructed but states that the rinse is not draining out of the other nostril. Pt would like advice. Please let us know what pt should do.

## 2022-03-25 NOTE — TELEPHONE ENCOUNTER
He really just needs to do the best he can with it.   It does not have to come out the other nose to be effective it

## 2022-04-01 ENCOUNTER — OFFICE VISIT (OUTPATIENT)
Dept: ENT CLINIC | Age: 67
End: 2022-04-01
Payer: MEDICARE

## 2022-04-01 VITALS — BODY MASS INDEX: 31.92 KG/M2 | WEIGHT: 223 LBS | HEIGHT: 70 IN

## 2022-04-01 DIAGNOSIS — J33.9 NASAL POLYPS: ICD-10-CM

## 2022-04-01 DIAGNOSIS — J32.4 CHRONIC PANSINUSITIS: Primary | ICD-10-CM

## 2022-04-01 PROCEDURE — 31237 NSL/SINS NDSC SURG BX POLYPC: CPT | Performed by: OTOLARYNGOLOGY

## 2022-04-01 RX ORDER — FLUTICASONE PROPIONATE 50 MCG
1 SPRAY, SUSPENSION (ML) NASAL DAILY
Qty: 32 G | Refills: 1 | Status: SHIPPED | OUTPATIENT
Start: 2022-04-01

## 2022-04-01 RX ORDER — MOMETASONE FUROATE 100 %
POWDER (GRAM) MISCELLANEOUS
Qty: 1 EACH | Refills: 6 | Status: SHIPPED | OUTPATIENT
Start: 2022-04-01

## 2022-04-01 NOTE — PROGRESS NOTES
Patient is following up for his endoscopic sinus surgery that I performed on March 22, 2022. Overall he is doing well. He does have a bad smell in his nose. Has been irrigating. He got used to it and feels that it really helps. Procedure  Bilateral nasal endoscopy with debridement  Afrin and lidocaine were applied the bilateral nasal cavity. A rigid scope was used to visualize left nasal cavity. There is a significant amount of thick dried blood and packing that I removed from the middle meatus with an alligator forcep and Marte suction. No evidence of purulent drainage. I was able to remove the packing out of the frontal sinus and maxillary sinus as well. Overall appears to be healing well    On the right side there was some thick dried blood and packing that I was able to remove from the middle meatus with an alligator forcep and Marte suction. There was some thicker crusting that was more difficult to remove the left in place in the posterior aspect of the maxillary antrostomy. Overall the sinus is healing well as well    Plan  Patient appears to be healing very well from surgery. I want him to start Flonase and I am going to order him double strength mometasone. Whenever he gets a double strength mometasone for his irrigations he can stop the Flonase. I will see him in 2 weeks.

## 2022-04-18 ENCOUNTER — OFFICE VISIT (OUTPATIENT)
Dept: ENT CLINIC | Age: 67
End: 2022-04-18
Payer: MEDICARE

## 2022-04-18 VITALS — WEIGHT: 221 LBS | BODY MASS INDEX: 31.64 KG/M2 | HEIGHT: 70 IN

## 2022-04-18 DIAGNOSIS — J32.4 CHRONIC PANSINUSITIS: Primary | ICD-10-CM

## 2022-04-18 PROCEDURE — 31237 NSL/SINS NDSC SURG BX POLYPC: CPT | Performed by: OTOLARYNGOLOGY

## 2022-04-18 RX ORDER — AMOXICILLIN AND CLAVULANATE POTASSIUM 875; 125 MG/1; MG/1
1 TABLET, FILM COATED ORAL 2 TIMES DAILY
Qty: 14 TABLET | Refills: 0 | Status: SHIPPED | OUTPATIENT
Start: 2022-04-18 | End: 2022-04-25

## 2022-04-18 NOTE — PROGRESS NOTES
Patient is following up from his endoscopic sinus surgery that I did on March 22, 2022. Overall he feels better. Still having a bit of drainage. Improved smell, but not perfect. He is using mometasone irrigations      Procedure  Bilateral nasal endoscopy with debridement  The left ear is visualized binoculars scope. There were some crusting removed from the middle meatus. There was purulent drainage evacuated from the frontal sinus with a Marte suction. There is no polyps. Overall healing very well    Similar findings on the right with more crusting in the middle meatus removed with a Marte suction. He had a lot of thick purulent drainage from the frontal sinus that I evacuated with a Marte suction    Plan  Overall patient is healing very well. He did have a bit of purulent drainage in the frontal sinuses today so I am when to give him a week of antibiotics.   Wanted to continue the mometasone irrigations and he will follow-up in 6 weeks

## 2022-06-02 ENCOUNTER — OFFICE VISIT (OUTPATIENT)
Dept: ENT CLINIC | Age: 67
End: 2022-06-02
Payer: MEDICARE

## 2022-06-02 VITALS — BODY MASS INDEX: 31.28 KG/M2 | WEIGHT: 218 LBS

## 2022-06-02 DIAGNOSIS — J32.4 CHRONIC PANSINUSITIS: Primary | ICD-10-CM

## 2022-06-02 DIAGNOSIS — J30.9 ALLERGIC RHINITIS, UNSPECIFIED SEASONALITY, UNSPECIFIED TRIGGER: ICD-10-CM

## 2022-06-02 DIAGNOSIS — J33.9 NASAL POLYPS: ICD-10-CM

## 2022-06-02 PROCEDURE — G8417 CALC BMI ABV UP PARAM F/U: HCPCS | Performed by: OTOLARYNGOLOGY

## 2022-06-02 PROCEDURE — 3017F COLORECTAL CA SCREEN DOC REV: CPT | Performed by: OTOLARYNGOLOGY

## 2022-06-02 PROCEDURE — 99024 POSTOP FOLLOW-UP VISIT: CPT | Performed by: OTOLARYNGOLOGY

## 2022-06-02 PROCEDURE — G8428 CUR MEDS NOT DOCUMENT: HCPCS | Performed by: OTOLARYNGOLOGY

## 2022-06-02 PROCEDURE — 1123F ACP DISCUSS/DSCN MKR DOCD: CPT | Performed by: OTOLARYNGOLOGY

## 2022-06-02 PROCEDURE — 31231 NASAL ENDOSCOPY DX: CPT | Performed by: OTOLARYNGOLOGY

## 2022-06-02 PROCEDURE — 1036F TOBACCO NON-USER: CPT | Performed by: OTOLARYNGOLOGY

## 2022-06-02 NOTE — PROGRESS NOTES
[address]- HEAD & NECK SURGERY  Follow up      Patient Name: Araceli Espinoza Record Number:  [id number]  Primary Care Physician:  Carmen Ignacio MD  Date of Consultation: 6/2/2022    Chief Complaint: Sinusitis        Interval History    Patient following up for his chronic sinusitis. I last saw him in April 2022. He says he has been doing the mometasone irrigations. Overall he feels really good. Sense of smell is okay. He is breathing okay out of his nose. He says that he knows that he does have allergies. Feels as though they are under pretty good control with the mometasone irrigations          REVIEW OF SYSTEMS  As above    PHYSICAL EXAM  GENERAL: No Acute Distress, Alert and Oriented, no Hoarseness, strong voice  EYES: EOMI, Anti-icteric  HENT:   Head: Normocephalic and atraumatic. Face:  Symmetric, facial nerve intact, no sinus tenderness  Right Ear: Normal external ear,  Left Ear: Normal external ear,  Mouth/Oral Cavity:  normal lips, Uvula is midline, no mucosal lesions, no trismus  Oropharynx/Larynx:  normal oropharynx  Nose:Normal external nasal appearance. See below  NECK: Normal range of motion, no thyromegaly, trachea is midline, no lymphadenopathy, no neck masses, no crepitus            PROCEDURE  Nasal endoscopy  Afrin and lidocaine were applied the bilateral nasal cavity. A rigid scope was used to visualize left nasal cavity. Overall is healing very well. There is still a slight amount of mucosal edema. There was a little bit of purulent drainage coming from the frontal sinus that I evacuated with a curved sinus suction. This appears to just be associated bit of healing granulation tissue rather than coming from the sinus itself. On the right side similar exam findings with a well-healing ethmoid cavity, maxillary antrostomy and frontal sinus drainage pathway. ASSESSMENT/PLAN  1. Chronic pansinusitis  Doing very well at this time.   I think we should continue the mometasone irrigations for now. I want to see him in 3 to 4 months for follow-up. He understands that if he feels as though he is getting any sinus infection I would like for him to call the office immediately. 2. Allergic rhinitis, unspecified seasonality, unspecified trigger  I discussed having him see an allergist.  He said he would like to hold off for now. 3. Nasal polyps  These currently are under control. If they regrow relatively quickly I will probably put him to see an allergist to consider something like Dupixent. I have performed a head and neck physical exam personally or was physically present during the key or critical portions of the service. This note was generated completely or in part utilizing Dragon dictation speech recognition software. Occasionally, words are mistranscribed and despite editing, the text may contain inaccuracies due to incorrect word recognition. If further clarification is needed please contact the office at [phone].

## 2022-09-21 ENCOUNTER — OFFICE VISIT (OUTPATIENT)
Dept: ENT CLINIC | Age: 67
End: 2022-09-21
Payer: MEDICARE

## 2022-09-21 VITALS
HEART RATE: 62 BPM | HEIGHT: 70 IN | DIASTOLIC BLOOD PRESSURE: 89 MMHG | SYSTOLIC BLOOD PRESSURE: 163 MMHG | BODY MASS INDEX: 30.21 KG/M2 | WEIGHT: 211 LBS

## 2022-09-21 DIAGNOSIS — J32.4 CHRONIC PANSINUSITIS: Primary | ICD-10-CM

## 2022-09-21 DIAGNOSIS — J33.9 NASAL POLYPS: ICD-10-CM

## 2022-09-21 PROCEDURE — 1123F ACP DISCUSS/DSCN MKR DOCD: CPT | Performed by: OTOLARYNGOLOGY

## 2022-09-21 PROCEDURE — G8427 DOCREV CUR MEDS BY ELIG CLIN: HCPCS | Performed by: OTOLARYNGOLOGY

## 2022-09-21 PROCEDURE — G8417 CALC BMI ABV UP PARAM F/U: HCPCS | Performed by: OTOLARYNGOLOGY

## 2022-09-21 PROCEDURE — 31231 NASAL ENDOSCOPY DX: CPT | Performed by: OTOLARYNGOLOGY

## 2022-09-21 PROCEDURE — 1036F TOBACCO NON-USER: CPT | Performed by: OTOLARYNGOLOGY

## 2022-09-21 PROCEDURE — 99213 OFFICE O/P EST LOW 20 MIN: CPT | Performed by: OTOLARYNGOLOGY

## 2022-09-21 PROCEDURE — 3017F COLORECTAL CA SCREEN DOC REV: CPT | Performed by: OTOLARYNGOLOGY

## 2022-09-21 RX ORDER — DOXYCYCLINE HYCLATE 100 MG
100 TABLET ORAL 2 TIMES DAILY
Qty: 14 TABLET | Refills: 0 | Status: SHIPPED | OUTPATIENT
Start: 2022-09-21 | End: 2022-09-28

## 2022-09-21 NOTE — PROGRESS NOTES
[address] [facility] N[facility]K SURGERY  Follow up      Patient Name: Araceli Espinoza Record Number:  [id number]  Primary Care Physician:  Milagros Arias MD  Date of Consultation: 9/21/2022    Chief Complaint: Nasal issues        Interval History    Patient is following up for his nasal issues. He has chronic sinusitis and nasal polyps. Overall he is doing well other than he says that he has been getting somewhat of a cold monthly. He says that it goes away without antibiotics or other interventions. He is using the steroid irrigations. His sense of smell is good. His ability to breathe out of his nose is good. REVIEW OF SYSTEMS  As above    PHYSICAL EXAM  GENERAL: No Acute Distress, Alert and Oriented, no Hoarseness, strong voice  EYES: EOMI, Anti-icteric  HENT:   Head: Normocephalic and atraumatic. Face:  Symmetric, facial nerve intact, no sinus tenderness  Right Ear: Normal external ear, normal external auditory canal, intact tympanic membrane with normal mobility and aerated middle ear  Left Ear: Normal external ear, normal external auditory canal, intact tympanic membrane with normal mobility and aerated middle ear  Mouth/Oral Cavity:  normal lips, Uvula is midline, no mucosal lesions, no trismus,  Oropharynx/Larynx:  normal oropharynx,  Nose:Normal external nasal appearance. A  NECK: Normal range of motion, no thyromegaly, trachea is midline, no lymphadenopathy, no neck masses, no crepitus        PROCEDURE  Nasal endoscopy  Afrin lidocaine were applied the bilateral nasal cavity. A rigid scope was used to visualize left nasal cavity. He has a little bit of mucosal edema, but the max antrostomy ethmoids and frontal sinus appear very open. On the right side he has small amount of crusting with some mucosal hypertrophy. Otherwise fairly open sinus cavities. I do not see any evidence of regrowth of polyps. ASSESSMENT/PLAN  1.  Chronic pansinusitis  Overall doing well, but he has had some intermittent issues that seem to go away on their own. He does have some mucosal edema and a bit of crusting. I am to give him a short course of doxycycline just to see if it helps. 2. Nasal polyps  I do not see obvious evidence of regrowth of the polyps. I think we will continue the steroid irrigations until he is out. I would then want him to switch back to Flonase. Plan to have him follow-up in 3 to 4 months. I have performed a head and neck physical exam personally or was physically present during the key or critical portions of the service. This note was generated completely or in part utilizing Dragon dictation speech recognition software. Occasionally, words are mistranscribed and despite editing, the text may contain inaccuracies due to incorrect word recognition. If further clarification is needed please contact the office at [phone].

## 2022-11-28 RX ORDER — FLUTICASONE PROPIONATE 50 MCG
SPRAY, SUSPENSION (ML) NASAL
Qty: 1 EACH | Refills: 3 | Status: SHIPPED | OUTPATIENT
Start: 2022-11-28

## (undated) DEVICE — FIRM 8CM: Brand: NASOPORE

## (undated) DEVICE — SUTURE ABSORBABLE MONOFILAMENT 4-0 SC-1 18 IN PLN GUT 1824H

## (undated) DEVICE — ENT I-LF: Brand: MEDLINE INDUSTRIES, INC.

## (undated) DEVICE — INSTRUMENT TRACKER 9733533XOM ENT 1PK

## (undated) DEVICE — BLADE 1882916 RAD60 3PK SINUS 2.9MM: Brand: RAD®

## (undated) DEVICE — SOLUTION IV IRRIG POUR BRL 0.9% SODIUM CHL 2F7124

## (undated) DEVICE — PATIENT TRACKER 9734887XOM NON-INVASIVE

## (undated) DEVICE — COVER LT HNDL BLU PLAS

## (undated) DEVICE — BLADE 1882040HR 5PK M4 INF TURB 2MM ROT: Brand: STRAIGHTSHOT

## (undated) DEVICE — BLADE 1884080EM TRICUT 4MMX13CM M4 ROHS: Brand: FUSION®

## (undated) DEVICE — TUBING, SUCTION, 1/4" X 12', STRAIGHT: Brand: MEDLINE

## (undated) DEVICE — SUTURE 4-0 L18IN ABSRB BRN PS-4 L16MM 1/2 CIR PRIM REV CUT 1643G

## (undated) DEVICE — SOLUTION,SALINE,IRRGATION,500ML,STRL: Brand: MEDLINE

## (undated) DEVICE — PROPEL CONTOUR SINUS IMPLANT
Type: IMPLANTABLE DEVICE | Site: NOSE | Status: NON-FUNCTIONAL
Brand: PROPEL CONTOUR

## (undated) DEVICE — 4-PORT MANIFOLD: Brand: NEPTUNE 2

## (undated) DEVICE — SPONGE,NEURO,1"X3",XR,STRL,LF,10/PK: Brand: MEDLINE

## (undated) DEVICE — AGENT HEMSTAT W2XL3IN OXIDIZED REGENERATED CELOS ABSRB

## (undated) DEVICE — MERCY HEALTH WEST TURNOVER: Brand: MEDLINE INDUSTRIES, INC.

## (undated) DEVICE — KIT,ANTI FOG,W/SPONGE & FLUID,SOFT PACK: Brand: MEDLINE

## (undated) DEVICE — BLADE SURG SHT MOD STBL ISCHIAL SCR

## (undated) DEVICE — GLOVE ORANGE PI 7 1/2   MSG9075

## (undated) DEVICE — NEEDLE HYPO 27GA L15IN REG BVL W O SFTY FOR SYR DISPOSABLE

## (undated) DEVICE — SYRINGE MED 10ML LUERLOCK TIP W/O SFTY DISP

## (undated) DEVICE — GOWN SIRUS NONREIN XL W/TWL: Brand: MEDLINE INDUSTRIES, INC.

## (undated) DEVICE — SUTURE PROL SZ 3-0 L18IN NONABSORBABLE BLU L30MM FS-1 3/8 8663G

## (undated) DEVICE — TOWEL,OR,DSP,ST,BLUE,STD,4/PK,20PK/CS: Brand: MEDLINE

## (undated) DEVICE — SYRINGE MED 30ML STD CLR PLAS LUERLOCK TIP N CTRL DISP

## (undated) DEVICE — TUBING 1895522 5PK STRAIGHTSHOT TO XPS: Brand: STRAIGHTSHOT®

## (undated) DEVICE — TURBINATOR WAND: Brand: COBLATION